# Patient Record
Sex: MALE | Race: WHITE | NOT HISPANIC OR LATINO | Employment: UNEMPLOYED | URBAN - METROPOLITAN AREA
[De-identification: names, ages, dates, MRNs, and addresses within clinical notes are randomized per-mention and may not be internally consistent; named-entity substitution may affect disease eponyms.]

---
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
## 2024-10-15 ENCOUNTER — TELEPHONE (OUTPATIENT)
Dept: PEDIATRICS | Facility: CLINIC | Age: 19
End: 2024-10-15
Payer: COMMERCIAL

## 2024-10-15 DIAGNOSIS — Z71.1 MENTAL HEALTH-RELATED COMPLAINT: Primary | ICD-10-CM

## 2024-10-15 NOTE — TELEPHONE ENCOUNTER
Needs referral to psychiatry   since she is over 18.   A sophomore in college.  Wants to see a counselor for anxiety, depression, overwhelming at college at [employer].  [facility] referral line said they can help refer him.    Let me know when referral is in.

## 2024-12-20 ENCOUNTER — APPOINTMENT (OUTPATIENT)
Dept: PEDIATRICS | Facility: CLINIC | Age: 19
End: 2024-12-20
Payer: COMMERCIAL

## 2024-12-20 VITALS
WEIGHT: 185 LBS | BODY MASS INDEX: 28.04 KG/M2 | DIASTOLIC BLOOD PRESSURE: 70 MMHG | SYSTOLIC BLOOD PRESSURE: 152 MMHG | HEIGHT: 68 IN | HEART RATE: 101 BPM

## 2024-12-20 DIAGNOSIS — Z00.00 WELLNESS EXAMINATION: Primary | ICD-10-CM

## 2024-12-20 PROCEDURE — 99395 PREV VISIT EST AGE 18-39: CPT | Performed by: PEDIATRICS

## 2024-12-20 PROCEDURE — 3008F BODY MASS INDEX DOCD: CPT | Performed by: PEDIATRICS

## 2024-12-20 NOTE — PROGRESS NOTES
"     Well Child (Pt is here for well child exam )      Concerns:   had   rash a few weeks    ago   [facility]  [facility]     Sleep: well rested and waking up well in the morning   Diet:  Varied diet   [place]:  soft and regular  Dental:  brushing twice a day and seeing dentist  School:     [employer]  Activities:   work out    cardio lifting  Drugs/Alcohol/Tobacco/Vaping: discussed   Sexuality/Puberty: discussed   Depression/Moods:     Exam:       height is 1.727 m (5' 8\") and weight is 83.9 kg (185 lb). His blood pressure is 158/92 (abnormal) and his pulse is 101.     General: Well-developed, well-nourished, alert and oriented, no acute distress  Eyes: Normal sclera, AYDEN, EOMI. Red reflex intact, light reflex symmetric.   ENT: Moist mucous membranes, normal throat, no nasal discharge. TMs are normal.  Cardiac:  Normal S1/S2, regular rhythm. Capillary refill less than 2 seconds. No clinically significant murmurs.    Pulmonary: Clear to auscultation bilaterally, no work of breathing.  GI: Soft nontender nondistended abdomen, no HSM, no masses.    Skin: No specific or unusual rashes  Neuro: Symmetric face, no ataxia, grossly normal strength.  Lymph and Neck: No lymphadenopathy, no visible thyroid swelling.  Orthopedic:  normal range of motion of shoulders and normal duck walk, normal spine/no scoliosis    Chaperone Present: Declined.  :   not examined   Assessment and Plan:    Diagnoses and all orders for this visit:  Wellness examination  Pediatric body mass index (BMI) of 5th percentile to less than 85th percentile for age          We discussed physical activity and nutritional requirements today. Continue to return annually for a checkup and any necessary booster vaccines.   You are responsible for  your own health decisions.  Please make sure you have access to your medical information via the [employer] ANALILIA    Type B meningitis vaccine has been available since 2015. Type B meningitis now accounts for 30% of all " meningitis cases because the original Type ACWY meningitis vaccine has worked so well. On average there are 1-2 college campuses affected per year with some cases.  We recommend this vaccine to prevent meningitis in late high school and college age children.     Tetanus booster (usually Tdap) should be given 10 years after the last one, typically around age 22 yrs.